# Patient Record
Sex: FEMALE | URBAN - METROPOLITAN AREA
[De-identification: names, ages, dates, MRNs, and addresses within clinical notes are randomized per-mention and may not be internally consistent; named-entity substitution may affect disease eponyms.]

---

## 2017-10-05 ENCOUNTER — APPOINTMENT (OUTPATIENT)
Age: 47
Setting detail: DERMATOLOGY
End: 2017-10-05

## 2017-10-05 DIAGNOSIS — Z41.9 ENCOUNTER FOR PROCEDURE FOR PURPOSES OTHER THAN REMEDYING HEALTH STATE, UNSPECIFIED: ICD-10-CM

## 2017-10-05 PROCEDURE — ? COSMETIC CONSULTATION: EXILIS

## 2017-10-05 PROCEDURE — ? MEDICAL CONSULTATION: PRODUCTS

## 2017-10-05 PROCEDURE — ? COSMETIC CONSULTATION: COOLSCULPTING

## 2017-10-05 PROCEDURE — ? MEDICAL CONSULTATION: BBL

## 2017-10-05 PROCEDURE — ? COSMETIC CONSULTATION: FRACTIONAL RESURFACING

## 2017-10-05 PROCEDURE — ? MEDICAL CONSULTATION: DYNAMIC RHYTIDES

## 2017-10-05 ASSESSMENT — LOCATION SIMPLE DESCRIPTION DERM
LOCATION SIMPLE: ABDOMEN
LOCATION SIMPLE: RIGHT CHEEK
LOCATION SIMPLE: LEFT CHEEK

## 2017-10-05 ASSESSMENT — LOCATION DETAILED DESCRIPTION DERM
LOCATION DETAILED: RIGHT CENTRAL MALAR CHEEK
LOCATION DETAILED: LEFT INFERIOR CENTRAL MALAR CHEEK
LOCATION DETAILED: PERIUMBILICAL SKIN

## 2017-10-05 ASSESSMENT — LOCATION ZONE DERM
LOCATION ZONE: TRUNK
LOCATION ZONE: FACE

## 2017-10-10 ENCOUNTER — APPOINTMENT (OUTPATIENT)
Age: 47
Setting detail: DERMATOLOGY
End: 2017-10-10

## 2017-10-10 DIAGNOSIS — Z41.9 ENCOUNTER FOR PROCEDURE FOR PURPOSES OTHER THAN REMEDYING HEALTH STATE, UNSPECIFIED: ICD-10-CM

## 2017-10-10 PROBLEM — L70.0 ACNE VULGARIS: Status: ACTIVE | Noted: 2017-10-10

## 2017-10-10 PROBLEM — L29.8 OTHER PRURITUS: Status: ACTIVE | Noted: 2017-10-10

## 2017-10-10 PROBLEM — F41.9 ANXIETY DISORDER, UNSPECIFIED: Status: ACTIVE | Noted: 2017-10-10

## 2017-10-10 PROCEDURE — ? BOTOX

## 2017-10-10 NOTE — PROCEDURE: BOTOX
Depressor Anguli Oris Units: 0
Price (Use Numbers Only, No Special Characters Or $): 371
Lot #: [id number]
Consent: Written consent obtained. Risks include but not limited to lid/brow ptosis, bruising, swelling, diplopia, temporary effect, incomplete chemical denervation.
Dilution (U/0.1 Cc): 4
Forehead Units: 10
Expiration Date (Month Year): 04/2020
Glabellar Complex Units: 25
Post-Care Instructions: Patient instructed to not lie down for 4 hours and limit physical activity for 24 hours. Patient instructed not to travel by airplane for 48 hours.
Detail Level: Detailed

## 2020-01-06 ENCOUNTER — IMPORTED ENCOUNTER (OUTPATIENT)
Dept: URBAN - METROPOLITAN AREA CLINIC 88 | Facility: CLINIC | Age: 50
End: 2020-01-06

## 2021-04-26 ENCOUNTER — IMPORTED ENCOUNTER (OUTPATIENT)
Dept: URBAN - METROPOLITAN AREA CLINIC 88 | Facility: CLINIC | Age: 51
End: 2021-04-26

## 2023-01-27 ENCOUNTER — APPOINTMENT (RX ONLY)
Dept: URBAN - METROPOLITAN AREA CLINIC 152 | Facility: CLINIC | Age: 53
Setting detail: DERMATOLOGY
End: 2023-01-27

## 2023-01-27 DIAGNOSIS — L82.1 OTHER SEBORRHEIC KERATOSIS: ICD-10-CM

## 2023-01-27 DIAGNOSIS — L30.9 DERMATITIS, UNSPECIFIED: ICD-10-CM

## 2023-01-27 DIAGNOSIS — L40.0 PSORIASIS VULGARIS: ICD-10-CM

## 2023-01-27 PROCEDURE — 99203 OFFICE O/P NEW LOW 30 MIN: CPT

## 2023-01-27 PROCEDURE — ? PRESCRIPTION

## 2023-01-27 PROCEDURE — ? PRESCRIPTION MEDICATION MANAGEMENT

## 2023-01-27 PROCEDURE — ? DIAGNOSIS COMMENT

## 2023-01-27 PROCEDURE — ? COUNSELING

## 2023-01-27 RX ORDER — HYDROCORTISONE 25 MG/G
OINTMENT TOPICAL
Qty: 28.35 | Refills: 0 | Status: ERX | COMMUNITY
Start: 2023-01-27

## 2023-01-27 RX ORDER — CLOBETASOL PROPIONATE 0.5 MG/G
AEROSOL, FOAM TOPICAL
Qty: 50 | Refills: 2 | Status: ERX | COMMUNITY
Start: 2023-01-27

## 2023-01-27 RX ADMIN — CLOBETASOL PROPIONATE: 0.5 AEROSOL, FOAM TOPICAL at 00:00

## 2023-01-27 RX ADMIN — HYDROCORTISONE: 25 OINTMENT TOPICAL at 00:00

## 2023-01-27 ASSESSMENT — LOCATION ZONE DERM
LOCATION ZONE: FACE
LOCATION ZONE: NECK

## 2023-01-27 ASSESSMENT — LOCATION DETAILED DESCRIPTION DERM
LOCATION DETAILED: RIGHT SUPERIOR ANTERIOR NECK
LOCATION DETAILED: LEFT CENTRAL MALAR CHEEK

## 2023-01-27 ASSESSMENT — LOCATION SIMPLE DESCRIPTION DERM
LOCATION SIMPLE: LEFT CHEEK
LOCATION SIMPLE: RIGHT ANTERIOR NECK

## 2023-01-27 NOTE — HPI: SKIN LESION
Is This A New Presentation, Or A Follow-Up?: Skin Lesion
Which Family Member (Optional)?: Paternal grandfather

## 2023-01-27 NOTE — PROCEDURE: DIAGNOSIS COMMENT
Comment: Rash of pt?s concern per HPI. Acute x 1 month, now resolving with Cetaphil moisturizer. Pt?s history of psoriasis is noted, but current rash appears more eczematous (ICD vs ACD). Will start topical HCT 2.5% ointment BID x 5-7 days to rash, counseling re: side effects reviewed. Pt instructed to reach out if progressing despite treatment or new symptoms arise.
Render Risk Assessment In Note?: no
Detail Level: Detailed
Comment: Chronic, stable on clobetasol foam PRN and T sal shampoo. Will refill clobetasol foam, counseling and use as below. Discussed association of scalp psoriasis with nail changes (unable to examine due to fingernail polish, pt denies changes) and PsA. Pt has OA for which she follows with Rheumatologist; has noticed recent hand swelling in morning, recommend setting up appt with Rheumatologist in next few weeks to assess for PsA, discussed progressive nature and need for DMARDs if present. All questions answered.
Comment: Lesion of pt’s concern per HPI.

## 2023-01-27 NOTE — HPI: RASH
Is This A New Presentation, Or A Follow-Up?: Rash
Additional History: Patient states the rash is getting better , she has been using cedefil cream

## 2023-01-27 NOTE — PROCEDURE: PRESCRIPTION MEDICATION MANAGEMENT
Render In Strict Bullet Format?: No
Detail Level: Zone
Initiate Treatment: hydrocortisone 2.5 % topical ointment
Initiate Treatment: clobetasol 0.05 % topical foam

## 2023-03-23 ENCOUNTER — FOLLOW UP (OUTPATIENT)
Dept: URBAN - METROPOLITAN AREA CLINIC 88 | Facility: CLINIC | Age: 53
End: 2023-03-23

## 2023-03-23 DIAGNOSIS — H40.013: ICD-10-CM

## 2023-03-23 DIAGNOSIS — H25.813: ICD-10-CM

## 2023-03-23 DIAGNOSIS — H40.053: ICD-10-CM

## 2023-03-23 DIAGNOSIS — H04.123: ICD-10-CM

## 2023-03-23 PROCEDURE — 92250 FUNDUS PHOTOGRAPHY W/I&R: CPT

## 2023-03-23 PROCEDURE — 92083 EXTENDED VISUAL FIELD XM: CPT

## 2023-03-23 PROCEDURE — 92014 COMPRE OPH EXAM EST PT 1/>: CPT

## 2023-03-23 ASSESSMENT — VISUAL ACUITY
OS_CC: 20/25
OD_GLARE: 20/200
OD_CC: 20/40+1

## 2023-03-23 ASSESSMENT — TONOMETRY
OS_IOP_MMHG: 18
OD_IOP_MMHG: 18

## 2023-05-11 ENCOUNTER — FOLLOW UP (OUTPATIENT)
Dept: URBAN - METROPOLITAN AREA CLINIC 88 | Facility: CLINIC | Age: 53
End: 2023-05-11

## 2023-05-11 DIAGNOSIS — H04.123: ICD-10-CM

## 2023-05-11 DIAGNOSIS — H52.223: ICD-10-CM

## 2023-05-11 DIAGNOSIS — H25.813: ICD-10-CM

## 2023-05-11 DIAGNOSIS — H40.013: ICD-10-CM

## 2023-05-11 PROCEDURE — 92025 CPTRIZED CORNEAL TOPOGRAPHY: CPT | Mod: NC

## 2023-05-11 PROCEDURE — 92136 OPHTHALMIC BIOMETRY: CPT

## 2023-05-11 PROCEDURE — 99213 OFFICE O/P EST LOW 20 MIN: CPT

## 2023-05-11 ASSESSMENT — TONOMETRY
OD_IOP_MMHG: 18
OS_IOP_MMHG: 17

## 2023-05-11 ASSESSMENT — VISUAL ACUITY
OS_SC: 20/25-2
OD_SC: 20/30

## 2023-09-21 ENCOUNTER — FOLLOW UP (OUTPATIENT)
Dept: URBAN - METROPOLITAN AREA CLINIC 88 | Facility: CLINIC | Age: 53
End: 2023-09-21

## 2023-09-21 DIAGNOSIS — H25.813: ICD-10-CM

## 2023-09-21 DIAGNOSIS — H16.223: ICD-10-CM

## 2023-09-21 DIAGNOSIS — H04.123: ICD-10-CM

## 2023-09-21 DIAGNOSIS — H52.223: ICD-10-CM

## 2023-09-21 DIAGNOSIS — H40.013: ICD-10-CM

## 2023-09-21 PROCEDURE — 99213 OFFICE O/P EST LOW 20 MIN: CPT

## 2023-09-21 ASSESSMENT — VISUAL ACUITY
OD_GLARE: 20/70
OD_CC: 20/40
OS_CC: 20/30

## 2023-09-21 ASSESSMENT — TONOMETRY
OD_IOP_MMHG: 17
OS_IOP_MMHG: 17

## 2023-10-14 ASSESSMENT — TONOMETRY
OD_IOP_MMHG: 22
OS_IOP_MMHG: 16
OS_IOP_MMHG: 17
OD_IOP_MMHG: 17
OS_IOP_MMHG: 19
OD_IOP_MMHG: 18

## 2023-10-14 ASSESSMENT — VISUAL ACUITY
OD_CC: 20/20
OD_CC: 20/25
OS_CC: 20/25
OS_CC: 20/20-1

## 2023-10-14 ASSESSMENT — PACHYMETRY
OD_CT_UM: 540
OS_CT_UM: 515

## 2024-07-18 ENCOUNTER — FOLLOW UP (OUTPATIENT)
Dept: URBAN - METROPOLITAN AREA CLINIC 88 | Facility: CLINIC | Age: 54
End: 2024-07-18

## 2024-07-18 DIAGNOSIS — H52.223: ICD-10-CM

## 2024-07-18 DIAGNOSIS — H40.013: ICD-10-CM

## 2024-07-18 DIAGNOSIS — H04.123: ICD-10-CM

## 2024-07-18 DIAGNOSIS — H16.223: ICD-10-CM

## 2024-07-18 DIAGNOSIS — H25.813: ICD-10-CM

## 2024-07-18 PROCEDURE — 92014 COMPRE OPH EXAM EST PT 1/>: CPT

## 2024-07-18 PROCEDURE — 92250 FUNDUS PHOTOGRAPHY W/I&R: CPT

## 2024-07-18 PROCEDURE — 92083 EXTENDED VISUAL FIELD XM: CPT

## 2024-07-18 ASSESSMENT — TONOMETRY
OD_IOP_MMHG: 18
OS_IOP_MMHG: 18

## 2024-07-18 ASSESSMENT — VISUAL ACUITY
OD_CC: 20/70
OS_CC: 20/30
OD_PH: 20/40